# Patient Record
Sex: FEMALE | Race: WHITE
[De-identification: names, ages, dates, MRNs, and addresses within clinical notes are randomized per-mention and may not be internally consistent; named-entity substitution may affect disease eponyms.]

---

## 2018-09-23 ENCOUNTER — HOSPITAL ENCOUNTER (OUTPATIENT)
Dept: HOSPITAL 92 - L&D/OP | Age: 23
Discharge: HOME | End: 2018-09-23
Attending: OBSTETRICS & GYNECOLOGY
Payer: COMMERCIAL

## 2018-09-23 VITALS — BODY MASS INDEX: 37.2 KG/M2

## 2018-09-23 DIAGNOSIS — O47.1: Primary | ICD-10-CM

## 2018-09-23 DIAGNOSIS — Z3A.38: ICD-10-CM

## 2018-09-23 PROCEDURE — 99283 EMERGENCY DEPT VISIT LOW MDM: CPT

## 2018-09-23 NOTE — PRG
DATE OF SERVICE:  09/23/2018

 

TIME OF EVALUATION:  1420 to 1435 hours.

 

LOCATION:  Labor and Delivery in triage A.

 

REASON FOR EVALUATION:  This is a patient of Dr. Walls, who is here for pelvic cramps and possible ea
rly contractions.

 

HISTORY OF PRESENT ILLNESS:  In brief, this is a 23-year-old G1, P0, who is at 38 weeks and 5 days wh
o sees Dr. Walls, who last saw him last week and she was about 2 cm at that time.  She comes now with
 increasing pelvic spasms concerning for contractions.  She has no vaginal bleeding or ruptured membr
anes.  She has good fetal movement.  She has no other prenatal issues.

 

REVIEW OF SYSTEMS:  Complete review of systems was checked and otherwise negative unless specified in
 the HPI.  It is important to note that on her review of systems; however, we did document and uncove
red that she had a history of herpes simplex virus, but she is currently on Valtrex suppression as nova crenshaw has no current symptoms.

 

PAST MEDICAL HISTORY:  Otherwise, negative.

 

SURGERIES:  Tonsils and adenoids.

 

OB HISTORY:  She is a G1, P0.

 

ALLERGIES:  None.

 

PHYSICAL EXAMINATION:

VITAL SIGNS:  Patient's blood pressure is 128/79, pulse is 80s, respirations are 18 and unlabored, nova crenshaw is afebrile.  BMI is 37.2.

GENERAL:  She is in no acute distress.

ABDOMEN:  Soft and nontender and size consistent with dates.

MUSCULOSKELETAL:  Cervical examination performed by Carmela, the patient's nurse, was 1.5-2 cm dila
neetu, about 50% effaced, -1 station, no evidence of rupture.

 

External fetal monitor shows category 1 strip with moderate variability and accelerations.  There are
 no pathological decelerations.  There is no real contraction pattern on tocodynamometer.  Fetal hear
t tones are reassuring.

 

ASSESSMENT:  This is a 23-year-old G1, P0 at 38 weeks and 5 days with Bowie Lloyd contractions.  Th
ere is no evidence of PIH for fetal complication.

 

PLAN:

1.  No acute need for intervention at this time.

2.  I have seen and evaluated the patient at bedside.

3.  No acute concerns at this time.

4.  The patient is doing well and we have running nonstress test which is reassuring.

5.  I have advised the patient to keep her followup appointment with Dr. Walls.

6.  I also provided her with Labor and Delivery details.

## 2018-10-02 ENCOUNTER — HOSPITAL ENCOUNTER (OUTPATIENT)
Dept: HOSPITAL 92 - L&D/OP | Age: 23
Discharge: HOME | End: 2018-10-02
Attending: OBSTETRICS & GYNECOLOGY
Payer: COMMERCIAL

## 2018-10-02 VITALS — BODY MASS INDEX: 37.2 KG/M2

## 2018-10-02 VITALS — SYSTOLIC BLOOD PRESSURE: 122 MMHG | DIASTOLIC BLOOD PRESSURE: 59 MMHG | TEMPERATURE: 99.2 F

## 2018-10-02 DIAGNOSIS — O99.89: ICD-10-CM

## 2018-10-02 DIAGNOSIS — R10.9: ICD-10-CM

## 2018-10-02 DIAGNOSIS — O47.9: Primary | ICD-10-CM

## 2018-10-02 PROCEDURE — 99283 EMERGENCY DEPT VISIT LOW MDM: CPT

## 2018-10-02 NOTE — PDOC.EVN
Event Note





- Event Note


Event Note: 





@ 0930:


I was asked to place triage orders for Ms Lew. First arrived with Dr Mancia. Please see Dr Walls's triage bnotes who saw this patient. these orders 

inputed by me as courtesy

## 2018-10-03 ENCOUNTER — HOSPITAL ENCOUNTER (INPATIENT)
Dept: HOSPITAL 92 - L&D/OP | Age: 23
LOS: 3 days | Discharge: HOME | End: 2018-10-06
Attending: OBSTETRICS & GYNECOLOGY | Admitting: OBSTETRICS & GYNECOLOGY
Payer: COMMERCIAL

## 2018-10-03 VITALS — BODY MASS INDEX: 37.2 KG/M2

## 2018-10-03 DIAGNOSIS — Z3A.40: ICD-10-CM

## 2018-10-03 DIAGNOSIS — Z79.899: ICD-10-CM

## 2018-10-03 DIAGNOSIS — B00.9: ICD-10-CM

## 2018-10-03 DIAGNOSIS — O41.1230: ICD-10-CM

## 2018-10-03 LAB
HBSAG INDEX: 0.21 S/CO (ref 0–0.99)
HGB BLD-MCNC: 11.1 G/DL (ref 12–16)
MCH RBC QN AUTO: 27.5 PG (ref 27–31)
MCV RBC AUTO: 84.8 FL (ref 78–98)
PLATELET # BLD AUTO: 388 THOU/UL (ref 130–400)
RBC # BLD AUTO: 4.06 MILL/UL (ref 4.2–5.4)
SYPHILIS ANTIBODY INDEX: 0.09 S/CO
WBC # BLD AUTO: 28.9 THOU/UL (ref 4.8–10.8)

## 2018-10-03 PROCEDURE — 0HQ9XZZ REPAIR PERINEUM SKIN, EXTERNAL APPROACH: ICD-10-PCS | Performed by: OBSTETRICS & GYNECOLOGY

## 2018-10-03 PROCEDURE — 87340 HEPATITIS B SURFACE AG IA: CPT

## 2018-10-03 PROCEDURE — S0020 INJECTION, BUPIVICAINE HYDRO: HCPCS

## 2018-10-03 PROCEDURE — 51702 INSERT TEMP BLADDER CATH: CPT

## 2018-10-03 PROCEDURE — 76815 OB US LIMITED FETUS(S): CPT

## 2018-10-03 PROCEDURE — 86900 BLOOD TYPING SEROLOGIC ABO: CPT

## 2018-10-03 PROCEDURE — 86901 BLOOD TYPING SEROLOGIC RH(D): CPT

## 2018-10-03 PROCEDURE — 82805 BLOOD GASES W/O2 SATURATION: CPT

## 2018-10-03 PROCEDURE — 36415 COLL VENOUS BLD VENIPUNCTURE: CPT

## 2018-10-03 PROCEDURE — 86780 TREPONEMA PALLIDUM: CPT

## 2018-10-03 PROCEDURE — 88307 TISSUE EXAM BY PATHOLOGIST: CPT

## 2018-10-03 PROCEDURE — 85027 COMPLETE CBC AUTOMATED: CPT

## 2018-10-03 PROCEDURE — 99285 EMERGENCY DEPT VISIT HI MDM: CPT

## 2018-10-03 PROCEDURE — 86850 RBC ANTIBODY SCREEN: CPT

## 2018-10-03 PROCEDURE — 3E033VJ INTRODUCTION OF OTHER HORMONE INTO PERIPHERAL VEIN, PERCUTANEOUS APPROACH: ICD-10-PCS | Performed by: OBSTETRICS & GYNECOLOGY

## 2018-10-03 RX ADMIN — Medication SCH MLS: at 23:36

## 2018-10-03 NOTE — PDOC.LDHP
Labor and Delivery H&P


Chief complaint: loss of fluid


HPI: 


22 y/o G1 at 40w0d, patient of Dr. Walls, presents with ctx. Is scheduled for 

induction tonight.  Denies VB, LOF, or decreased FM.





ROS neg for HEENT, cv, pulm, gi, gu, neuro, psych, skin, musculoskeletal or 

constitutional symptoms other than mentioned above.


OB History Details: 





None


Current pregnancy complications: none


Past Medical History: 





HSV


Current medications: pre- vitamins


Previous surgical history: none


Allergies/Adverse Reactions: 


 Allergies











Allergy/AdvReac Type Severity Reaction Status Date / Time


 


No Known Allergies Allergy   Verified 10/03/18 15:19











Social history: tobacco use (tobacco use early in pregnancy but quit)





- Physical Exam


Vital signs reviewed and normal: yes


General: NAD, resting


Lungs: nonlabored breathing


Abdomen: gravid


Extremeties: no edema


FHT: category 1 (140s, mod variability, + accels, no decels)


Fieldon contractions every: 3 mins





- Vaginal Exam


cm dilated: 2


Effacement: 90%


Station: -2





- OB Labs


GBS: positive


Additional Labs: 





Amnisure negative





- Assessment


L&D Assessment: term patient in labor





- Plan


Plan: admit to L&D, GBS antibiotic prophylaxis, informed consent obtained, 

anesthesia consult for pain management (if desired)


-: 





Dr. Walls notified.

## 2018-10-04 LAB
ANALYZER IN CARDIO: (no result)
BASE EXCESS STD BLDA CALC-SCNC: -0.9 MEQ/L
HCO3 BLDA-SCNC: 25.6 MEQ/L (ref 22–28)

## 2018-10-04 RX ADMIN — DOCUSATE CALCIUM SCH MG: 240 CAPSULE, LIQUID FILLED ORAL at 22:05

## 2018-10-04 RX ADMIN — Medication SCH MLS: at 11:00

## 2018-10-04 RX ADMIN — Medication SCH MLS: at 15:41

## 2018-10-04 RX ADMIN — Medication SCH MLS: at 04:04

## 2018-10-04 RX ADMIN — Medication SCH MLS: at 07:55

## 2018-10-05 VITALS — TEMPERATURE: 98.2 F | DIASTOLIC BLOOD PRESSURE: 77 MMHG | SYSTOLIC BLOOD PRESSURE: 137 MMHG

## 2018-10-05 LAB
HGB BLD-MCNC: 8.8 G/DL (ref 12–16)
MCH RBC QN AUTO: 27.7 PG (ref 27–31)
MCV RBC AUTO: 85.2 FL (ref 78–98)
PLATELET # BLD AUTO: 306 THOU/UL (ref 130–400)
RBC # BLD AUTO: 3.18 MILL/UL (ref 4.2–5.4)
WBC # BLD AUTO: 22.2 THOU/UL (ref 4.8–10.8)

## 2018-10-05 RX ADMIN — DOCUSATE CALCIUM SCH MG: 240 CAPSULE, LIQUID FILLED ORAL at 09:23

## 2018-10-05 RX ADMIN — Medication SCH: at 07:41

## 2018-10-05 RX ADMIN — DOCUSATE CALCIUM SCH MG: 240 CAPSULE, LIQUID FILLED ORAL at 20:11
